# Patient Record
Sex: FEMALE | Race: WHITE | NOT HISPANIC OR LATINO | URBAN - NONMETROPOLITAN AREA
[De-identification: names, ages, dates, MRNs, and addresses within clinical notes are randomized per-mention and may not be internally consistent; named-entity substitution may affect disease eponyms.]

---

## 2018-03-13 NOTE — PATIENT DISCUSSION
Patient educated that she is a candidate for [facility] if she is highly motivated to be without reading glasses. Patient educated she does have some macular degeneration and if it were to worsen, the [employer] lens may not work as well.

## 2018-03-13 NOTE — PATIENT DISCUSSION
Patient educated with [facility] for distance, they will need glasses for all near and intermediate activities after surgery. Patient educated there is a 10% chance of needing enhancement after surgery. Patient elects [facility], goal of emmetropia.

## 2020-10-28 NOTE — PATIENT DISCUSSION
"Lubricant ointment 1/4"" in both eyes at bedtime. Okay to use on skin of upper eyelids for psoriasis. "

## 2022-05-26 ENCOUNTER — NEW PATIENT (OUTPATIENT)
Dept: URBAN - NONMETROPOLITAN AREA CLINIC 4 | Facility: CLINIC | Age: 62
End: 2022-05-26

## 2022-05-26 DIAGNOSIS — H52.12: ICD-10-CM

## 2022-05-26 PROCEDURE — 92015 DETERMINE REFRACTIVE STATE: CPT

## 2022-05-26 PROCEDURE — 92004 COMPRE OPH EXAM NEW PT 1/>: CPT

## 2022-05-26 ASSESSMENT — TONOMETRY
OD_IOP_MMHG: 16
OS_IOP_MMHG: 16

## 2022-05-26 ASSESSMENT — VISUAL ACUITY
OU_CC: J1+
OS_SC: 20/20
OU_SC: 20/20
OD_SC: 20/30

## 2024-02-16 ENCOUNTER — COMPREHENSIVE EXAM (OUTPATIENT)
Dept: URBAN - NONMETROPOLITAN AREA CLINIC 4 | Facility: CLINIC | Age: 64
End: 2024-02-16

## 2024-02-16 DIAGNOSIS — H52.01: ICD-10-CM

## 2024-02-16 DIAGNOSIS — H52.221: ICD-10-CM

## 2024-02-16 DIAGNOSIS — H52.12: ICD-10-CM

## 2024-02-16 DIAGNOSIS — H52.4: ICD-10-CM

## 2024-02-16 PROCEDURE — 92015 DETERMINE REFRACTIVE STATE: CPT

## 2024-02-16 PROCEDURE — 92014 COMPRE OPH EXAM EST PT 1/>: CPT

## 2024-02-16 ASSESSMENT — TONOMETRY
OD_IOP_MMHG: 13
OS_IOP_MMHG: 13

## 2024-02-16 ASSESSMENT — VISUAL ACUITY
OD_SC: 20/20-2
OS_SC: 20/30

## 2025-04-07 ENCOUNTER — COMPREHENSIVE EXAM (OUTPATIENT)
Age: 65
End: 2025-04-07

## 2025-04-07 DIAGNOSIS — H43.813: ICD-10-CM

## 2025-04-07 DIAGNOSIS — H25.13: ICD-10-CM

## 2025-04-07 PROCEDURE — 99214 OFFICE O/P EST MOD 30 MIN: CPT
